# Patient Record
Sex: FEMALE | Race: BLACK OR AFRICAN AMERICAN | NOT HISPANIC OR LATINO
[De-identification: names, ages, dates, MRNs, and addresses within clinical notes are randomized per-mention and may not be internally consistent; named-entity substitution may affect disease eponyms.]

---

## 2021-11-05 ENCOUNTER — RESULT REVIEW (OUTPATIENT)
Age: 46
End: 2021-11-05

## 2021-11-05 ENCOUNTER — OUTPATIENT (OUTPATIENT)
Dept: OUTPATIENT SERVICES | Facility: HOSPITAL | Age: 46
LOS: 1 days | End: 2021-11-05
Payer: COMMERCIAL

## 2021-11-05 ENCOUNTER — APPOINTMENT (OUTPATIENT)
Dept: RADIOLOGY | Facility: HOSPITAL | Age: 46
End: 2021-11-05

## 2021-11-05 DIAGNOSIS — R76.11 NONSPECIFIC REACTION TO TUBERCULIN SKIN TEST WITHOUT ACTIVE TUBERCULOSIS: ICD-10-CM

## 2021-11-05 PROCEDURE — 71046 X-RAY EXAM CHEST 2 VIEWS: CPT | Mod: 26

## 2022-09-06 PROBLEM — Z00.00 ENCOUNTER FOR PREVENTIVE HEALTH EXAMINATION: Status: ACTIVE | Noted: 2022-09-06

## 2022-09-08 ENCOUNTER — APPOINTMENT (OUTPATIENT)
Dept: ORTHOPEDIC SURGERY | Facility: CLINIC | Age: 47
End: 2022-09-08

## 2022-09-08 DIAGNOSIS — M79.672 PAIN IN LEFT FOOT: ICD-10-CM

## 2022-09-08 DIAGNOSIS — M72.2 PLANTAR FASCIAL FIBROMATOSIS: ICD-10-CM

## 2022-09-08 DIAGNOSIS — M79.671 PAIN IN RIGHT FOOT: ICD-10-CM

## 2022-09-08 PROCEDURE — 73630 X-RAY EXAM OF FOOT: CPT | Mod: LT

## 2022-09-08 PROCEDURE — 73610 X-RAY EXAM OF ANKLE: CPT | Mod: RT

## 2022-09-08 PROCEDURE — 99204 OFFICE O/P NEW MOD 45 MIN: CPT

## 2022-09-08 RX ORDER — MELOXICAM 15 MG/1
15 TABLET ORAL DAILY
Qty: 20 | Refills: 0 | Status: ACTIVE | COMMUNITY
Start: 2022-09-08 | End: 1900-01-01

## 2022-09-08 NOTE — HISTORY OF PRESENT ILLNESS
[Gradual] : gradual [Dull/Aching] : dull/aching [Intermittent] : intermittent [Rest] : rest [de-identified] : 09/08/2022 \par \ailyn EMMANUEL is a 47 year old female here today for RT foot pain for a month, LT foot pain started one week ago, both more so over arch. Pt states she regularly runs, training for a marathon. Denies trauma/fall to B/L extremities. She denies calf pain and swelling, no shortness of breath. Denies catching and locking of the foot, no n/t foot, No h/o rec ankle sprain. Patient has been changing her running shoes often. [] : no [FreeTextEntry1] : bilateral feet  [FreeTextEntry5] : onset of pain in right foot approx. one month, left foot pain apporox. one week. denies injury/trauma/N/T.  [de-identified] : palpation, running

## 2022-09-08 NOTE — ASSESSMENT
[FreeTextEntry1] : After their examination today in the office and review of the radiographs, I do think their heel pain is secondary to development of mid substance plantar fasciitis as well as pain beneath the plantar medial aspect of her feet bilaterally which I do think is secondary to changing her running shoes at around the onset of her symptoms 4 weeks ago. This is secondary to tightening of the plantar fascia and localized pain and inflammation at the origin and midsubstance of the plantar fascia. We discussed that this can be treated conservatively in the office today. I did recommend eccentric stretching exercises 3 times a day, which were taught and demonstrated to them today as well as deep tissue massage and local ice massage to the entire plantar fascia. I would like them to perform these stretches and exercises and local modalities 3 times a day. I did also recommend a full length orthotic with a medial arch support and cushioned heel for them to wear on a daily basis in all of their sneakers and shoes. They can also use an oral anti-inflammatory orally for the next  2-3 weeks and then on an as-needed basis if needed and tolerated. I would like them to refrain from high-impact physical activities till the pain improves or resolves. I would also like them to start an anti-inflammatory daily for the next 10-14 days. We discussed that it can take 6-10 weeks for this pain to completely resolve. I will see them back in 6 weeks for repeat clinical and x-ray examination, and if there is minimal to no improvement of their pain then I would recommend beginning physical therapy as well as wearing a plantar fascia night splint and a possible cortisone injection if necessary\par

## 2022-10-06 ENCOUNTER — APPOINTMENT (OUTPATIENT)
Dept: ORTHOPEDIC SURGERY | Facility: CLINIC | Age: 47
End: 2022-10-06

## 2023-05-11 ENCOUNTER — OFFICE (OUTPATIENT)
Dept: URBAN - METROPOLITAN AREA CLINIC 12 | Facility: CLINIC | Age: 48
Setting detail: OPHTHALMOLOGY
End: 2023-05-11
Payer: COMMERCIAL

## 2023-05-11 DIAGNOSIS — H16.223: ICD-10-CM

## 2023-05-11 DIAGNOSIS — H17.9: ICD-10-CM

## 2023-05-11 DIAGNOSIS — H40.013: ICD-10-CM

## 2023-05-11 DIAGNOSIS — H52.13: ICD-10-CM

## 2023-05-11 PROCEDURE — 92133 CPTRZD OPH DX IMG PST SGM ON: CPT | Performed by: OPHTHALMOLOGY

## 2023-05-11 PROCEDURE — 92020 GONIOSCOPY: CPT | Performed by: OPHTHALMOLOGY

## 2023-05-11 PROCEDURE — 92014 COMPRE OPH EXAM EST PT 1/>: CPT | Performed by: OPHTHALMOLOGY

## 2023-05-11 ASSESSMENT — VISUAL ACUITY
OS_BCVA: 20/20
OD_BCVA: 20/20

## 2023-05-11 ASSESSMENT — SUPERFICIAL PUNCTATE KERATITIS (SPK)
OS_SPK: 1+
OD_SPK: 1+

## 2023-05-11 ASSESSMENT — REFRACTION_CURRENTRX
OD_VPRISM_DIRECTION: PROGS
OS_CYLINDER: SPH
OD_AXIS: 000
OD_SPHERE: +0.50
OS_SPHERE: +0.50
OS_AXIS: 000
OD_ADD: +1.25
OD_CYLINDER: SPH
OS_VPRISM_DIRECTION: PROGS
OD_OVR_VA: 20/
OS_ADD: +1.25
OS_OVR_VA: 20/

## 2023-05-11 ASSESSMENT — TONOMETRY
OS_IOP_MMHG: 17
OD_IOP_MMHG: 17

## 2023-05-11 ASSESSMENT — REFRACTION_MANIFEST
OS_CYLINDER: -0.50
OS_SPHERE: +0.50
OD_AXIS: 000
OS_AXIS: 090
OD_SPHERE: +0.50
OD_VA1: 20/20-1
OD_CYLINDER: SPH
OS_VA1: 20/20-1

## 2023-05-11 ASSESSMENT — REFRACTION_AUTOREFRACTION
OS_SPHERE: +0.25
OD_CYLINDER: SPH
OD_SPHERE: +0.25
OS_AXIS: 170
OS_CYLINDER: -0.50

## 2023-05-11 ASSESSMENT — CONFRONTATIONAL VISUAL FIELD TEST (CVF)
OD_FINDINGS: FULL
OS_FINDINGS: FULL

## 2023-05-11 ASSESSMENT — AXIALLENGTH_DERIVED
OS_AL: 23.0921
OS_AL: 23.1858

## 2023-05-11 ASSESSMENT — KERATOMETRY
OS_K2POWER_DIOPTERS: 45.00
OD_AXISANGLE_DEGREES: 090
OD_K1POWER_DIOPTERS: 45.00
OD_K2POWER_DIOPTERS: 45.00
OS_AXISANGLE_DEGREES: 093
OS_K1POWER_DIOPTERS: 44.25

## 2023-05-11 ASSESSMENT — SPHEQUIV_DERIVED
OS_SPHEQUIV: 0
OS_SPHEQUIV: 0.25

## 2023-11-30 ENCOUNTER — OFFICE (OUTPATIENT)
Dept: URBAN - METROPOLITAN AREA CLINIC 12 | Facility: CLINIC | Age: 48
Setting detail: OPHTHALMOLOGY
End: 2023-11-30
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
## 2023-11-30 PROCEDURE — NO SHOW FE NO SHOW FEE: Performed by: OPHTHALMOLOGY

## 2024-05-04 ENCOUNTER — NON-APPOINTMENT (OUTPATIENT)
Age: 49
End: 2024-05-04